# Patient Record
Sex: FEMALE | Race: WHITE | NOT HISPANIC OR LATINO | Employment: STUDENT | URBAN - METROPOLITAN AREA
[De-identification: names, ages, dates, MRNs, and addresses within clinical notes are randomized per-mention and may not be internally consistent; named-entity substitution may affect disease eponyms.]

---

## 2017-10-02 ENCOUNTER — OFFICE VISIT (OUTPATIENT)
Dept: URGENT CARE | Facility: CLINIC | Age: 27
End: 2017-10-02
Payer: COMMERCIAL

## 2017-10-02 VITALS
SYSTOLIC BLOOD PRESSURE: 108 MMHG | DIASTOLIC BLOOD PRESSURE: 70 MMHG | WEIGHT: 133.25 LBS | BODY MASS INDEX: 22.75 KG/M2 | HEIGHT: 64 IN | RESPIRATION RATE: 18 BRPM | OXYGEN SATURATION: 98 % | HEART RATE: 102 BPM | TEMPERATURE: 100 F

## 2017-10-02 DIAGNOSIS — J06.9 UPPER RESPIRATORY TRACT INFECTION, UNSPECIFIED TYPE: Primary | ICD-10-CM

## 2017-10-02 LAB
CTP QC/QA: YES
FLUAV AG NPH QL: NEGATIVE
FLUBV AG NPH QL: NEGATIVE

## 2017-10-02 PROCEDURE — 99999 PR PBB SHADOW E&M-EST. PATIENT-LVL III: CPT | Mod: PBBFAC,,, | Performed by: NURSE PRACTITIONER

## 2017-10-02 PROCEDURE — 87804 INFLUENZA ASSAY W/OPTIC: CPT | Mod: QW,S$GLB,, | Performed by: NURSE PRACTITIONER

## 2017-10-02 PROCEDURE — 3008F BODY MASS INDEX DOCD: CPT | Mod: S$GLB,,, | Performed by: NURSE PRACTITIONER

## 2017-10-02 PROCEDURE — 99213 OFFICE O/P EST LOW 20 MIN: CPT | Mod: S$GLB,,, | Performed by: NURSE PRACTITIONER

## 2017-10-02 RX ORDER — MULTIVITAMIN
1 TABLET ORAL DAILY
COMMUNITY

## 2017-10-02 NOTE — PATIENT INSTRUCTIONS
Viral Upper Respiratory Illness (Adult)  You have a viral upper respiratory illness (URI), which is another term for the common cold. This illness is contagious during the first few days. It is spread through the air by coughing and sneezing. It may also be spread by direct contact (touching the sick person and then touching your own eyes, nose, or mouth). Frequent handwashing will decrease risk of spread. Most viral illnesses go away within 7 to 10 days with rest and simple home remedies. Sometimes the illness may last for several weeks. Antibiotics will not kill a virus, and they are generally not prescribed for this condition.    Home care  · If symptoms are severe, rest at home for the first 2 to 3 days. When you resume activity, don't let yourself get too tired.  · Avoid being exposed to cigarette smoke (yours or others).  · You may use acetaminophen or ibuprofen to control pain and fever, unless another medicine was prescribed. (Note: If you have chronic liver or kidney disease, have ever had a stomach ulcer or gastrointestinal bleeding, or are taking blood-thinning medicines, talk with your healthcare provider before using these medicines.) Aspirin should never be given to anyone under 18 years of age who is ill with a viral infection or fever. It may cause severe liver or brain damage.  · Your appetite may be poor, so a light diet is fine. Avoid dehydration by drinking 6 to 8 glasses of fluids per day (water, soft drinks, juices, tea, or soup). Extra fluids will help loosen secretions in the nose and lungs.  · Over-the-counter cold medicines will not shorten the length of time youre sick, but they may be helpful for the following symptoms: cough, sore throat, and nasal and sinus congestion. (Note: Do not use decongestants if you have high blood pressure.)  Follow-up care  Follow up with your healthcare provider, or as advised.  When to seek medical advice  Call your healthcare provider right away if any  of these occur:  · Cough with lots of colored sputum (mucus)  · Severe headache; face, neck, or ear pain  · Difficulty swallowing due to throat pain  · Fever of 100.4°F (38°C)  Call 911, or get immediate medical care  Call emergency services right away if any of these occur:  · Chest pain, shortness of breath, wheezing, or difficulty breathing  · Coughing up blood  · Inability to swallow due to throat pain  Date Last Reviewed: 9/13/2015  © 9330-7683 [employer]. [address] [place] [id number]. All rights reserved. This information is not intended as a substitute for professional medical care. Always follow your healthcare professional's instructions.

## 2017-10-02 NOTE — PROGRESS NOTES
Subjective:       Patient ID: Sarah Lawson is a 26 y.o. female.    Chief Complaint: Generalized Body Aches and Sinus Problem    Influenza   This is a new problem. The current episode started yesterday. The problem has been waxing and waning. Associated symptoms include chills, coughing (mild), myalgias (started today) and a sore throat (improved since yesterday). Pertinent negatives include no chest pain, congestion, diaphoresis, fatigue, fever, headaches or weakness. Treatments tried: dayquil/nyquil. The treatment provided mild relief.     Review of Systems   Constitutional: Positive for chills. Negative for diaphoresis, fatigue and fever.   HENT: Positive for rhinorrhea, sinus pressure, sneezing and sore throat (improved since yesterday). Negative for congestion, ear discharge, ear pain, postnasal drip and sinus pain.    Respiratory: Positive for cough (mild). Negative for shortness of breath and wheezing.    Cardiovascular: Negative for chest pain and palpitations.   Musculoskeletal: Positive for myalgias (started today). Negative for back pain.   Neurological: Negative for weakness and headaches.       Objective:      Physical Exam   Constitutional: She is oriented to person, place, and time. She appears well-developed and well-nourished. No distress.   HENT:   Head: Normocephalic.   Right Ear: External ear and ear canal normal. Tympanic membrane is not erythematous. A middle ear effusion is present.   Left Ear: External ear and ear canal normal. Tympanic membrane is not erythematous. A middle ear effusion is present.   Nose: Nose normal. No mucosal edema or rhinorrhea. Right sinus exhibits no maxillary sinus tenderness and no frontal sinus tenderness. Left sinus exhibits no maxillary sinus tenderness and no frontal sinus tenderness.   Mouth/Throat: Uvula is midline, oropharynx is clear and moist and mucous membranes are normal. No oropharyngeal exudate, posterior oropharyngeal edema or posterior  oropharyngeal erythema.   Eyes: Conjunctivae and EOM are normal.   Neck: Normal range of motion. Neck supple.   Cardiovascular: Normal rate, regular rhythm and normal heart sounds.    Pulmonary/Chest: Effort normal and breath sounds normal. No accessory muscle usage. No apnea, no tachypnea and no bradypnea. No respiratory distress. She has no decreased breath sounds. She has no wheezes. She has no rhonchi. She has no rales.   Lymphadenopathy:        Head (right side): No submental, no submandibular and no tonsillar adenopathy present.        Head (left side): No submental, no submandibular and no tonsillar adenopathy present.     She has no cervical adenopathy.   Neurological: She is alert and oriented to person, place, and time.   Skin: Skin is warm and dry. She is not diaphoretic.       Assessment:       1. Upper respiratory tract infection, unspecified type        Plan:   Sarah was seen today for generalized body aches and sinus problem.    Diagnoses and all orders for this visit:    Upper respiratory tract infection, unspecified type  -     POCT Influenza A/B        -     Diagnosis and treatment discussed, AVS provided  -     Symptomatic tx discussed   -     Follow up with PCP or ER immediately for worsening, new or no improvement of symptoms.   -     Patient understands and agrees with plan

## 2017-10-02 NOTE — LETTER
October 2, 2017      [facility]  [address]  [facility] [address]  Phone: [phone]  Fax: [phone]       Patient: Sarah Lawson   YOB: 1990  Date of Visit: 10/02/2017    To Whom It May Concern:    KENNEY Lawson  was at [facility] on 10/02/2017. She may return to work/school on 10/5/17 with no restrictions. If you have any questions or concerns, or if I can be of further assistance, please do not hesitate to contact me.    Sincerely,    Amanda Elder NP

## 2024-05-21 ENCOUNTER — OFFICE VISIT (OUTPATIENT)
Dept: URGENT CARE | Facility: CLINIC | Age: 34
End: 2024-05-21
Payer: COMMERCIAL

## 2024-05-21 VITALS
DIASTOLIC BLOOD PRESSURE: 73 MMHG | OXYGEN SATURATION: 98 % | WEIGHT: 129 LBS | SYSTOLIC BLOOD PRESSURE: 110 MMHG | RESPIRATION RATE: 18 BRPM | TEMPERATURE: 98 F | HEART RATE: 81 BPM | BODY MASS INDEX: 22.86 KG/M2 | HEIGHT: 63 IN

## 2024-05-21 DIAGNOSIS — J32.9 SINUSITIS, UNSPECIFIED CHRONICITY, UNSPECIFIED LOCATION: Primary | ICD-10-CM

## 2024-05-21 DIAGNOSIS — J02.9 SORE THROAT: ICD-10-CM

## 2024-05-21 LAB
CTP QC/QA: YES
MOLECULAR STREP A: NEGATIVE

## 2024-05-21 PROCEDURE — 99203 OFFICE O/P NEW LOW 30 MIN: CPT | Mod: S$GLB,,, | Performed by: FAMILY MEDICINE

## 2024-05-21 PROCEDURE — 87651 STREP A DNA AMP PROBE: CPT | Mod: QW,S$GLB,, | Performed by: FAMILY MEDICINE

## 2024-05-21 RX ORDER — AZITHROMYCIN 250 MG/1
TABLET, FILM COATED ORAL
Qty: 6 EACH | Refills: 0 | Status: SHIPPED | OUTPATIENT
Start: 2024-05-21 | End: 2024-05-26

## 2024-05-21 NOTE — PROGRESS NOTES
"Subjective:      Patient ID: Sarah Watters is a 33 y.o. female.    Vitals:  height is 5' 3" (1.6 m) and weight is 58.5 kg (129 lb). Her oral temperature is 98 °F (36.7 °C). Her blood pressure is 110/73 and her pulse is 81. Her respiration is 18 and oxygen saturation is 98%.     Chief Complaint: Sinus Problem    34 yo female patient presents to clinic with sinus problems, pnd, production of green mucous from cough and out of the nose, sore throat. Onset about 9 days ago. Her throat is hurting more today. Hurts to swallow. One side is worse than the other. Patient took claritin D for symptoms before. Took plain Claritin this morning. Unknown sick contacts.     Sinus Problem  This is a new problem. The current episode started 1 to 4 weeks ago. There has been no fever. Associated symptoms include congestion, coughing, headaches, neck pain, sinus pressure and a sore throat. Pertinent negatives include no chills, ear pain or shortness of breath. Past treatments include saline nose sprays and acetaminophen.       Constitution: Negative for chills and fever.   HENT:  Positive for congestion, sinus pressure and sore throat. Negative for ear pain.    Neck: Positive for neck pain.   Cardiovascular: Negative.    Eyes: Negative.    Respiratory:  Positive for cough and sputum production. Negative for bloody sputum and shortness of breath.    Gastrointestinal: Negative.    Genitourinary: Negative.    Musculoskeletal:  Negative for muscle ache.   Skin: Negative.    Neurological:  Positive for headaches. Negative for dizziness.   Psychiatric/Behavioral: Negative.        Objective:     Physical Exam   Constitutional: She is oriented to person, place, and time. No distress.   HENT:   Head: Normocephalic.   Ears:   Right Ear: Tympanic membrane, external ear and ear canal normal.   Left Ear: Tympanic membrane, external ear and ear canal normal.   Nose: Sinus tenderness and congestion present. No epistaxis. Right sinus exhibits " maxillary sinus tenderness. Left sinus exhibits maxillary sinus tenderness.   Mouth/Throat: Mucous membranes are moist. Posterior oropharyngeal erythema present. No oropharyngeal exudate.   Eyes: Conjunctivae are normal. Pupils are equal, round, and reactive to light. Extraocular movement intact   Neck: Neck supple.   Cardiovascular: Normal rate, regular rhythm, normal heart sounds and normal pulses.   Pulmonary/Chest: Effort normal and breath sounds normal.   Abdominal: Normal appearance. She exhibits no distension. Soft. There is no abdominal tenderness. There is no left CVA tenderness and no right CVA tenderness.   Musculoskeletal: Normal range of motion.         General: Normal range of motion.   Neurological: no focal deficit. She is alert and oriented to person, place, and time.   Skin: Skin is warm and no rash.   Psychiatric: Her behavior is normal. Mood, judgment and thought content normal.   Nursing note and vitals reviewed.      Assessment:     1. Sinusitis, unspecified chronicity, unspecified location    2. Sore throat        Plan:       Sinusitis, unspecified chronicity, unspecified location    Sore throat  -     POCT Strep A, Molecular    Other orders  -     azithromycin (Z-SUE) 250 MG tablet; Take 2 tablets by mouth on day 1; Take 1 tablet by mouth on days 2-5  Dispense: 6 each; Refill: 0        Review of patient's allergies indicates:  No Known Allergies      SUMMARY: See hpi. Dx'd Sinusitis. Supportive measures. See below. Adding antibiotic. Provider recheck if continues or new symptoms.     Patient Instructions   Thank you for allowing our team to take care of you today.  Your diagnosis sinusitis/pharyngitis secondary to an initial viral infection.  Strep negative today.  Continue supportive measures as listed below. Adding antibiotic Zithromax.  If any symptoms continue or worsen, get an immediate medical provider/ER evaluation.      SYMPTOM MEDICATIONS IF NEEDED AND APPROPRIATE:    You may gargle  with warm salt water/peroxide 4 times a day as needed for irritated throat.     Make sure you are getting rest.    You can use cough drops or lozenges to soothe your sore throat and for cough     You can also take Elderberry and/or Emergen-C to help boost your immune system.    Antihistamine/Decongestant can be used for congestion/drainage.     Flonase nasal spray can be used for nasal congestion. Two sprays each nostril daily.     Honey is a natural cough suppressant that can be used.    Make sure to stay well hydrated.    Use Nasal Saline Spray to keep nasal passages moist.    A Humidifier can be used to keep moisture in the air.       PAIN/DISCOMFORT:  Tylenol and Ibuprofen can be alternated every 4 hours if needed for aches or fever if no allergy or restriction.     If your symptoms do not improve, get a medical provider evaluation. Followup here as needed. If your symptoms worsen, go to the emergency room.

## 2024-05-21 NOTE — PATIENT INSTRUCTIONS
Thank you for allowing our team to take care of you today.  Your diagnosis sinusitis/pharyngitis secondary to an initial viral infection.  Strep negative today.  Continue supportive measures as listed below. Adding antibiotic Zithromax.  If any symptoms continue or worsen, get an immediate medical provider/ER evaluation.      SYMPTOM MEDICATIONS IF NEEDED AND APPROPRIATE:    You may gargle with warm salt water/peroxide 4 times a day as needed for irritated throat.     Make sure you are getting rest.    You can use cough drops or lozenges to soothe your sore throat and for cough     You can also take Elderberry and/or Emergen-C to help boost your immune system.    Antihistamine/Decongestant can be used for congestion/drainage.     Flonase nasal spray can be used for nasal congestion. Two sprays each nostril daily.     Honey is a natural cough suppressant that can be used.    Make sure to stay well hydrated.    Use Nasal Saline Spray to keep nasal passages moist.    A Humidifier can be used to keep moisture in the air.       PAIN/DISCOMFORT:  Tylenol and Ibuprofen can be alternated every 4 hours if needed for aches or fever if no allergy or restriction.     If your symptoms do not improve, get a medical provider evaluation. Followup here as needed. If your symptoms worsen, go to the emergency room.